# Patient Record
Sex: MALE | NOT HISPANIC OR LATINO | Employment: FULL TIME | URBAN - METROPOLITAN AREA
[De-identification: names, ages, dates, MRNs, and addresses within clinical notes are randomized per-mention and may not be internally consistent; named-entity substitution may affect disease eponyms.]

---

## 2022-04-21 ENCOUNTER — APPOINTMENT (OUTPATIENT)
Dept: CT IMAGING | Facility: CLINIC | Age: 32
End: 2022-04-21
Attending: STUDENT IN AN ORGANIZED HEALTH CARE EDUCATION/TRAINING PROGRAM
Payer: COMMERCIAL

## 2022-04-21 ENCOUNTER — HOSPITAL ENCOUNTER (EMERGENCY)
Facility: CLINIC | Age: 32
Discharge: HOME | End: 2022-04-21
Attending: EMERGENCY MEDICINE | Admitting: EMERGENCY MEDICINE
Payer: COMMERCIAL

## 2022-04-21 ENCOUNTER — APPOINTMENT (OUTPATIENT)
Dept: ULTRASOUND IMAGING | Facility: CLINIC | Age: 32
End: 2022-04-21
Attending: STUDENT IN AN ORGANIZED HEALTH CARE EDUCATION/TRAINING PROGRAM
Payer: COMMERCIAL

## 2022-04-21 VITALS
BODY MASS INDEX: 25.61 KG/M2 | DIASTOLIC BLOOD PRESSURE: 76 MMHG | TEMPERATURE: 98.9 F | HEART RATE: 67 BPM | SYSTOLIC BLOOD PRESSURE: 109 MMHG | OXYGEN SATURATION: 97 % | RESPIRATION RATE: 16 BRPM | WEIGHT: 150 LBS | HEIGHT: 64 IN

## 2022-04-21 DIAGNOSIS — N50.812 TESTICULAR PAIN, LEFT: ICD-10-CM

## 2022-04-21 DIAGNOSIS — N20.1 URETERAL STONE: ICD-10-CM

## 2022-04-21 LAB
ALBUMIN UR-MCNC: 30 MG/DL
ANION GAP SERPL CALCULATED.3IONS-SCNC: 6 MMOL/L (ref 3–14)
APPEARANCE UR: CLEAR
BILIRUB UR QL STRIP: NEGATIVE
BUN SERPL-MCNC: 17 MG/DL (ref 7–30)
CALCIUM SERPL-MCNC: 9.4 MG/DL (ref 8.5–10.1)
CHLORIDE BLD-SCNC: 105 MMOL/L (ref 94–109)
CO2 SERPL-SCNC: 26 MMOL/L (ref 20–32)
COLOR UR AUTO: YELLOW
CREAT SERPL-MCNC: 1.26 MG/DL (ref 0.66–1.25)
GFR SERPL CREATININE-BSD FRML MDRD: 78 ML/MIN/1.73M2
GLUCOSE BLD-MCNC: 109 MG/DL (ref 70–99)
GLUCOSE UR STRIP-MCNC: NEGATIVE MG/DL
HGB UR QL STRIP: ABNORMAL
KETONES UR STRIP-MCNC: ABNORMAL MG/DL
LEUKOCYTE ESTERASE UR QL STRIP: NEGATIVE
MUCOUS THREADS #/AREA URNS LPF: PRESENT /LPF
NITRATE UR QL: NEGATIVE
PH UR STRIP: 6 [PH] (ref 5–7)
POTASSIUM BLD-SCNC: 4.2 MMOL/L (ref 3.4–5.3)
RBC URINE: 19 /HPF
SODIUM SERPL-SCNC: 137 MMOL/L (ref 133–144)
SP GR UR STRIP: 1.03 (ref 1–1.03)
SQUAMOUS EPITHELIAL: <1 /HPF
UROBILINOGEN UR STRIP-MCNC: NORMAL MG/DL
WBC URINE: 2 /HPF

## 2022-04-21 PROCEDURE — 96374 THER/PROPH/DIAG INJ IV PUSH: CPT | Mod: 59

## 2022-04-21 PROCEDURE — 36415 COLL VENOUS BLD VENIPUNCTURE: CPT | Performed by: STUDENT IN AN ORGANIZED HEALTH CARE EDUCATION/TRAINING PROGRAM

## 2022-04-21 PROCEDURE — 80048 BASIC METABOLIC PNL TOTAL CA: CPT | Performed by: STUDENT IN AN ORGANIZED HEALTH CARE EDUCATION/TRAINING PROGRAM

## 2022-04-21 PROCEDURE — 74176 CT ABD & PELVIS W/O CONTRAST: CPT

## 2022-04-21 PROCEDURE — 99285 EMERGENCY DEPT VISIT HI MDM: CPT | Mod: 25

## 2022-04-21 PROCEDURE — 250N000011 HC RX IP 250 OP 636: Performed by: STUDENT IN AN ORGANIZED HEALTH CARE EDUCATION/TRAINING PROGRAM

## 2022-04-21 PROCEDURE — 93976 VASCULAR STUDY: CPT

## 2022-04-21 PROCEDURE — 81001 URINALYSIS AUTO W/SCOPE: CPT | Performed by: STUDENT IN AN ORGANIZED HEALTH CARE EDUCATION/TRAINING PROGRAM

## 2022-04-21 RX ORDER — TAMSULOSIN HYDROCHLORIDE 0.4 MG/1
0.4 CAPSULE ORAL DAILY
Qty: 7 CAPSULE | Refills: 0 | Status: SHIPPED | OUTPATIENT
Start: 2022-04-21 | End: 2022-04-28

## 2022-04-21 RX ORDER — KETOROLAC TROMETHAMINE 15 MG/ML
10 INJECTION, SOLUTION INTRAMUSCULAR; INTRAVENOUS ONCE
Status: COMPLETED | OUTPATIENT
Start: 2022-04-21 | End: 2022-04-21

## 2022-04-21 RX ORDER — OXYCODONE HYDROCHLORIDE 5 MG/1
5 TABLET ORAL EVERY 6 HOURS PRN
Qty: 6 TABLET | Refills: 0 | Status: SHIPPED | OUTPATIENT
Start: 2022-04-21 | End: 2022-04-24

## 2022-04-21 RX ADMIN — KETOROLAC TROMETHAMINE 10 MG: 15 INJECTION, SOLUTION INTRAMUSCULAR; INTRAVENOUS at 13:22

## 2022-04-21 ASSESSMENT — ENCOUNTER SYMPTOMS
CHILLS: 0
RHINORRHEA: 0
HEMATURIA: 0
FLANK PAIN: 0
DYSURIA: 0
ABDOMINAL PAIN: 1
VOMITING: 0
FEVER: 0
NAUSEA: 0
SORE THROAT: 0
DIARRHEA: 0
BLOOD IN STOOL: 0
COUGH: 0
BACK PAIN: 0
SHORTNESS OF BREATH: 0

## 2022-04-21 NOTE — ED PROVIDER NOTES
Emergency Department Attending Supervision Note  4/21/2022  2:14 PM      I evaluated this patient in conjunction with JOSE Sams      Briefly, the patient presented with left-sided testicular pain      On my exam,   General: Alert, No distress. Nontoxic appearance  Head: No signs of trauma.   Mouth/Throat: Oropharynx moist.   Eyes: Conjunctivae are normal. Pupils are equal..   Neck: Normal range of motion.    CV: Appears well perfused.  Resp:No respiratory distress.   MSK: Normal range of motion. No obvious deformity.   Neuro: The patient is alert and interactive. STOCK. Speech normal. GCS 15  Skin: No lesion or sign of trauma noted.   Psych: normal mood and affect. behavior is normal.       Results:  CT Abdomen Pelvis w/o Contrast   Preliminary Result   IMPRESSION: 0.2 cm left ureterovesical junction stone just in the   bladder lumen. No hydronephrosis identified.       US Testicular & Scrotum w Doppler Ltd   Final Result   IMPRESSION:   1.  There is arterial and venous flow to the left testicle, which   should indicate there is not current torsion. Blood flow slightly   decreased on the left compared to the right, however. Intermittent   torsion not excluded.       MONIQUE CHILDRESS MD        ED course:  Urology consultation  Testicular/scrotal ultrasound  Stone protocol abdominal CT        Diagnosis    ICD-10-CM    1. Testicular pain, left  N50.812    2. Ureteral stone  N20.1          Khoa Mcnamara MD Joing, Todd Roger, MD  04/21/22 3145

## 2022-04-21 NOTE — CONSULTS
[facility] Inpatient Consultation Note    Nicolas Cantu MRN# [id number]   Age: 31 year old YOB: 1990     Date of Admission:  4/21/2022    Reason for consult: Left testicular pain       Requesting physician: Norma Hamm PA-C / Khoa Mcnamara MD                History of Present Illness:   Nicolas Cantu is a healthy 31 year old year old male presenting to the [facility] ED with acute onset of severe and sharp pain in his left testicle three hours ago. Pain radiates upwards through his groin. He also notes sensation of urinary urgency.     Scrotal and testicular US noted arterial and venous flow to the left testicle, which should indicate there is not current torsion. Blood flow slightly decreased on the left compared to the right, however and intermittent torsion is not excluded. Upon my evaluation he rated his pain 9/10, but pain was quickly relieved with toradol. On exam, his left testicle was without mass or tenderness and was non-edematous. No relief of pain with elevation of testicle. He denies dysuria, gross hematuria, nausea or vomiting. Patient has not eaten since last night.              Past Medical History:   History reviewed. No pertinent past medical history.          Past Surgical History:   History reviewed. No pertinent surgical history.          Social History:     Social History     Socioeconomic History     Marital status: Single     Spouse name: Not on file     Number of children: Not on file     Years of education: Not on file     Highest education level: Not on file   Occupational History     Not on file   Tobacco Use     Smoking status: Not on file     Smokeless tobacco: Not on file   Substance and Sexual Activity     Alcohol use: Not Currently     Drug use: Never     Sexual activity: Not Currently   Other Topics Concern     Not on file   Social History Narrative     Not on file     Social Determinants of Health     Financial Resource Strain: Not on file   Food  "Insecurity: Not on file   Transportation Needs: Not on file   Physical Activity: Not on file   Stress: Not on file   Social Connections: Not on file   Intimate Partner Violence: Not on file   Housing Stability: Not on file             Family History:   No family history on file.          Immunizations:     There is no immunization history on file for this patient.          Allergies:   No Known Allergies          Medications:     No current facility-administered medications for this encounter.     No current outpatient medications on file.             Review of Systems:   Comprehensive review of systems from the ED provider note dated 4/21/22 at [facility] was reviewed with no changes except per HPI.     Examination:  /76   Pulse 67   Temp 98.9  F (37.2  C) (Temporal)   Resp 16   Ht 1.626 m (5' 4\")   Wt 68 kg (150 lb)   SpO2 98%   BMI 25.75 kg/m    General: Alert and oriented. Patient is distressed and wincing in pain.   HEENT: Face symmetric, mucous membranes moist and pink  Eyes: No scleral icterus  Neck: Symmetric  Chest wall: Symmetric  Respiratory: Breathing unlabored, no audible wheezing  Cardiac: Extremities warm and well perfused, no edema  Abdomen: soft, tender to LLQ and inguinal region, non distended; no rebound, guarding or peritoneal signs; no inguinal hernias palpable  Back: No CVA or flank tenderness  : uncircumcised phallus; testicles palpable and non-tender bilaterally with no masses; no relief of discomfort with elevation of left testicle  Extremities: No evidence of deformities or trauma  Neuro:Grossly non focal  Pysch: Normal mood and affect  Skin: No evident rashes or lesions            Data:   No results found for: WBC  No results found for: RBC  No results found for: HGB  No results found for: HCT  No results found for: PLT  No results found for: CR]  No results found for: BUN    Imaging:    US TESTICULAR AND SCROTUM WITH DOPPLER LIMITED 4/21/2022 12:57 " PM     CLINICAL HISTORY: testicular pain (left)  TECHNIQUE: Ultrasound of scrotum with color flow and spectral Doppler  with waveform analysis performed.     COMPARISON: None.     FINDINGS:     RIGHT: Right testicle measures 4 x 2.5 x 2 cm. Normal testicle with no  masses. Normal arterial duplex and normal color flow. Normal  epididymis. No hydrocele. No varicocele.     LEFT: Left testicle measures 4 x 2.5 x 2 cm. Normal grayscale  appearance of the testicle. There is visible arterial and venous flow  within the testicle, however decreased when compared to the right. One  of the arterial waveforms obtained is higher resistance, the other is  normal low resistance. Normal epididymis. No hydrocele. No varicocele.                                                                      IMPRESSION:  1.  There is arterial and venous flow to the left testicle, which  should indicate there is not current torsion. Blood flow slightly  decreased on the left compared to the right, however. Intermittent  torsion not excluded.    Impression:  31 year old yo male with left testicular and left groin pain.  Scrotal US confirms arterial and venous flow to testicle, though flow slightly decreased on left indicating possible intermittent torsion. UA has microhematuria.    Plan:  - Exam and US are not suggestive of acute testicular torsion  - It is possible pain may be referred from a ureteral calculus, especially with presence of microhematuria on UA; CT a/p WO recommended  - If no kidney stone identified on CT, would recommend close outpatient urology follow up for hematuria workup and/or intermittent torsion follow up    This plan was discussed with ED provider, Norma Hamm PA-C.       This patient was discussed with Dr. Gutierres.     Belle Dowling PA-C  [facility]   [email or web address]  Text Page (7:30am to 4:30pm)

## 2022-04-21 NOTE — DISCHARGE INSTRUCTIONS
You can take 600 mg ibuprofen every 6 hours, starting at 9:30 pm.     You can take oxycodone every 6 hours as needed for breakthrough pain.    Also take flomax (tamsulosin) every day for a week.    Strain your urine every time you urinate until you see a stone pass.    Make sure you are drinking plenty of water. Limit coffee and other caffeine consumption.    Your creatinine (kidney function) was slightly elevated at 1.26. You can have this rechecked in clinic.     Discharge Instructions  Kidney Stones    Kidney stones are a common problem that can cause a lot of pain but fortunately are usually not dangerous. Kidney stones form in the kidney and then can cause a blockage (obstruction) of the flow of urine from the kidney which leads to pain. Most patients can manage kidney stones at home (without a hospital stay).  However, sometimes your condition may be worse than it seemed at first, or may get worse with time. Most kidney stones will pass on their own, but occasionally stones may need to be removed by an urologist.    Generally, every Emergency Department visit should have a follow-up clinic visit with either a primary or a specialty clinic/provider. Please follow-up as instructed by your emergency provider today.      Return to the Emergency Department if:  Your pain is not controlled despite the medications provided or recommended.  You are vomiting (throwing up) and cannot keep fluids or medications down.  You develop a fever (>100.4 F).  You feel much more ill or develop new symptoms.  What can I do to help myself?  Be sure to drink plenty of fluids.  If instructed to do so, strain your urine (pee) with the urine strainer you were provided with today. Your stone may look like a grain of sand or a small pebble. Collect any stones in the cup provided and bring to your follow-up appointment.  Staying active is good, and may help the stone to pass. You may do whatever you feel up to doing without restrictions.    Treatment:  Non-steroidal anti-inflammatory drugs (NSAIDs). This includes prescription medicines like Toradol  (ketorolac) and non-prescription medicines like Advil  (ibuprofen) and Nuprin  (ibuprofen) and Naproxen. These pain relievers are very effective for kidney stones.  Nausea (sick to your stomach) medication.  Nausea and vomiting are common with kidney stones, so your provider may send you home with medicine for this.   Flomax  (tamsulosin). This medicine is sometimes used for men with prostate problems, but also can help kidney stones to pass. Its effectiveness is controversial or questionable so it is prescribed in certain situations. This medicine can lower blood pressure, and you may feel faint/lightheaded, especially when you first stand up. Be sure to get up gradually, sit down if you feel faint, and avoid activity where feeling faint would be dangerous, such as climbing ladders.  If you were given a prescription for medicine here today, be sure to read all of the information (including the package insert) that comes with your prescription.  This will include important information about the medicine, its side effects, and any warnings that you need to know about.  The pharmacist who fills the prescription can provide more information and answer questions you may have about the medicine.  If you have questions or concerns that the pharmacist cannot address, please call or return to the Emergency Department.   Remember that you can always come back to the Emergency Department if you are not able to see your regular provider in the amount of time listed above, if you get any new symptoms, or if there is anything that worries you.

## 2022-04-21 NOTE — ED PROVIDER NOTES
History     Chief Complaint:  Testicular/scrotal Pain (Developed left severe testicular pain approximately 2 hours ago. )       TIAGO Cantu is a previously healthy 31 year old male who presents with left testicular pain.  Patient reports that pain began suddenly around 930 this morning in his left testicle, about 3 hours prior to arrival.  He describes the pain radiating from his left testicle up into his lower abdomen.  He rates the pain 9 out of 10.  He has not taken anything for the pain yet.  He denies nausea or vomiting.    Denies urinary pain, penile discharge, or hematuria.  He states he is not sexually active.      ROS:  Review of Systems   Constitutional: Negative for chills and fever.   HENT: Negative for rhinorrhea and sore throat.    Eyes: Negative for visual disturbance.   Respiratory: Negative for cough and shortness of breath.    Cardiovascular: Negative for chest pain.   Gastrointestinal: Positive for abdominal pain. Negative for blood in stool, diarrhea, nausea and vomiting.   Genitourinary: Positive for testicular pain. Negative for dysuria, flank pain, hematuria, penile discharge and penile pain.   Musculoskeletal: Negative for back pain.   All other systems reviewed and are negative.      Allergies:  No Known Allergies     Medications:    None    Past Medical History:    History reviewed. No pertinent past medical history.  There is no problem list on file for this patient.       Past Surgical History:    History reviewed. No pertinent surgical history.     Family History:    family history is not on file.    Social History:   reports previous alcohol use. He reports that he does not use drugs.  Presents to the ED with his mother.    Physical Exam     Patient Vitals for the past 24 hrs:   BP Temp Temp src Pulse Resp SpO2 Height Weight   04/21/22 1345 109/76 -- -- 67 -- 98 % -- --   04/21/22 1232 138/89 -- -- 77 16 100 % -- --   04/21/22 1216 130/67 98.9  F (37.2  C) Temporal 86  "16 99 % 1.626 m (5' 4\") 68 kg (150 lb)        Physical Exam  Vitals: Reviewed, as above.    General: Alert and oriented, in moderate distress. Standing in room, alternately leaning over bed and standing up straight. Holding testicle. Appears uncomfortable.  Skin: Warm and well-perfused. No rashes, lesions, or erythema.   HEENT: Head: Normocephalic, atraumatic. Facial features symmetric. Eyes: Conjunctiva pink, sclera white. EOMs grossly intact. Ears: Auricles without lesion, erythema, or edema. Mouth and throat: Lips are moist with no chapping, lesions, or edema, Buccal mucosa is pink and moist without lesions.   Neck: Supple with no lymphadenopathy. Full ROM.   Pulmonary: Chest wall expansion symmetric with no increased work of breathing. Lungs clear to auscultation bilaterally.   Cardiovascular: Heart RRR with no murmurs, rubs, or gallops. 2+ radial and tibialis posterior pulses bilaterally. No peripheral edema.  Abdominal: No CVA tenderness No hernias, scars, lesions, striae, or distension. Bowel sounds present and physiologic. Abdomen is soft and nontender to light and deep palpation in all 4 quadrants with no guarding or rebound. No masses or organomegaly.   : Uncircumcised male. No lesions, erythema, or edema. No penile discharge. No tenderness to palpation of bilateral testes or epididymides. No varicocele. Unable to assess cremasteric reflex, as patient had been holding his testicles just prior to exam. Negative Prehn sign.   Musculoskeletal: Moves all extremities spontaneously.  Psych: Affect appropriate.  Answers questions appropriately. Patient appears nervous.      Emergency Department Course     Imaging:  CT Abdomen Pelvis w/o Contrast   Preliminary Result   IMPRESSION: 0.2 cm left ureterovesical junction stone just in the   bladder lumen. No hydronephrosis identified.      US Testicular & Scrotum w Doppler Ltd   Final Result   IMPRESSION:   1.  There is arterial and venous flow to the left testicle, " which   should indicate there is not current torsion. Blood flow slightly   decreased on the left compared to the right, however. Intermittent   torsion not excluded.      MONIQUE CHILDRESS MD            SYSTEM ID:  [id number]         Report per radiology    Laboratory:  Labs Ordered and Resulted from Time of ED Arrival to Time of ED Departure   ROUTINE UA WITH MICROSCOPIC REFLEX TO CULTURE - Abnormal       Result Value    Color Urine Yellow      Appearance Urine Clear      Glucose Urine Negative      Bilirubin Urine Negative      Ketones Urine Trace (*)     Specific Gravity Urine 1.035      Blood Urine Moderate (*)     pH Urine 6.0      Protein Albumin Urine 30  (*)     Urobilinogen Urine Normal      Nitrite Urine Negative      Leukocyte Esterase Urine Negative      Mucus Urine Present (*)     RBC Urine 19 (*)     WBC Urine 2      Squamous Epithelials Urine <1     BASIC METABOLIC PANEL - Abnormal    Sodium 137      Potassium 4.2      Chloride 105      Carbon Dioxide (CO2) 26      Anion Gap 6      Urea Nitrogen 17      Creatinine 1.26 (*)     Calcium 9.4      Glucose 109 (*)     GFR Estimate 78              Emergency Department Course:     Reviewed:  I reviewed nursing notes, vitals, past medical history and Care Everywhere    Assessments/Consults:   ED Course as of 04/21/22 1440   u Apr 21, 2022   1228 I obtained history and examined the patient, as noted above.    1312 I spoke with Belle Dowling PA-C on call for urology regarding patient's presentation, findings, and plan of care.   1330 Belle Dowling PA-C, evaluated the patient in the ED.    1333 I spoke again with Belle Dowling PA-C, regarding patient's evaluation. She is initiating contact with urologist on call for possible surgical exploration and orchiopexy.   1348 I spoke again with Belle Dowling PA-C. After speaking with urologist on call, as ultrasound read is equivocal for torsion, she recommends CT abdomen/pelvis w/o contrast to assess for ureteral stone.    1424 I rechecked the patient with Dr. Mcnamara and explained findings. Patient reported resolution of his pain.         Interventions:  Medications   ketorolac (TORADOL) injection 10 mg (10 mg Intravenous Given 4/21/22 1322)        Disposition:  The patient was discharged to home.     Impression & Plan    CMS Diagnoses: None    Medical Decision Making:  Nicolas is a previously healthy 31 year old male who presents with left testicular pain.  See HPI and physical exam for full details.  Differential diagnosis included testicular torsion, epididymitis, UTI, appendageal torsion, inguinal hernia, hydrocele, scrotal hematoma, ureteral stone, and others.  Patient is vitally stable and afebrile.  Patient has a negative Prehn sign and no marked testicular swelling.  Due to patient's intense discomfort and history concerning for torsion, an ultrasound was swiftly obtained, which showed arterial blood flow to the left testicle, but slightly decreased compared to right, so intermittent torsion could not be excluded per radiology read.  I consulted promptly with urology, who did not recommend immediate surgical intervention and preferred to proceed to a CT abdomen pelvis without contrast to assess for ureteral stone.  Urinalysis was also obtained, which showed moderate RBCs. Negative for acute infection. Creatinine mildly elevated at 1.26. CT abdomen/pelvis showed a 0.2 cm ureteral stone at the left UVJ. Patient's pain is resolved following toradol and is ready to go home. I instructed him to use ibuprofen for pain at home. I also provided prescriptions for flomax and oxycodone. He was instructed to strain his urine and follow up closely with [facility]. Patient verbalized understanding of this plan. Discharged home in stable condition.       Diagnosis:    ICD-10-CM    1. Testicular pain, left  N50.812    2. Ureteral stone  N20.1         Discharge Medications:  New Prescriptions    OXYCODONE (ROXICODONE) 5 MG TABLET    Take  1 tablet (5 mg) by mouth every 6 hours as needed for severe pain    TAMSULOSIN (FLOMAX) 0.4 MG CAPSULE    Take 1 capsule (0.4 mg) by mouth daily for 7 doses           Norma Hamm PA-C  04/21/22 2220

## 2022-10-03 ENCOUNTER — HEALTH MAINTENANCE LETTER (OUTPATIENT)
Age: 32
End: 2022-10-03

## 2023-10-22 ENCOUNTER — HEALTH MAINTENANCE LETTER (OUTPATIENT)
Age: 33
End: 2023-10-22

## 2024-12-14 ENCOUNTER — HEALTH MAINTENANCE LETTER (OUTPATIENT)
Age: 34
End: 2024-12-14